# Patient Record
Sex: FEMALE | NOT HISPANIC OR LATINO
[De-identification: names, ages, dates, MRNs, and addresses within clinical notes are randomized per-mention and may not be internally consistent; named-entity substitution may affect disease eponyms.]

---

## 2017-03-02 ENCOUNTER — RX ONLY (OUTPATIENT)
Age: 67
Setting detail: RX ONLY
End: 2017-03-02

## 2017-03-02 RX ORDER — FLUOROURACIL 2 G/40G
CREAM TOPICAL
Qty: 1 | Refills: 0 | Status: ERX | COMMUNITY
Start: 2017-03-02

## 2017-03-20 ENCOUNTER — APPOINTMENT (RX ONLY)
Dept: URBAN - NONMETROPOLITAN AREA CLINIC 25 | Facility: CLINIC | Age: 67
Setting detail: DERMATOLOGY
End: 2017-03-20

## 2017-03-20 VITALS — HEIGHT: 63 IN | WEIGHT: 138 LBS | SYSTOLIC BLOOD PRESSURE: 146 MMHG | DIASTOLIC BLOOD PRESSURE: 96 MMHG

## 2017-03-20 DIAGNOSIS — L57.0 ACTINIC KERATOSIS: ICD-10-CM

## 2017-03-20 DIAGNOSIS — L65.9 NONSCARRING HAIR LOSS, UNSPECIFIED: ICD-10-CM

## 2017-03-20 DIAGNOSIS — L98.8 OTHER SPECIFIED DISORDERS OF THE SKIN AND SUBCUTANEOUS TISSUE: ICD-10-CM

## 2017-03-20 PROBLEM — L90.8 OTHER ATROPHIC DISORDERS OF SKIN: Status: ACTIVE | Noted: 2017-03-20

## 2017-03-20 PROBLEM — L85.3 XEROSIS CUTIS: Status: ACTIVE | Noted: 2017-03-20

## 2017-03-20 PROCEDURE — ? PATIENT SPECIFIC COUNSELING

## 2017-03-20 PROCEDURE — 17000 DESTRUCT PREMALG LESION: CPT

## 2017-03-20 PROCEDURE — 99213 OFFICE O/P EST LOW 20 MIN: CPT | Mod: 25

## 2017-03-20 PROCEDURE — ? LIQUID NITROGEN

## 2017-03-20 PROCEDURE — ? COUNSELING

## 2017-03-20 ASSESSMENT — LOCATION DETAILED DESCRIPTION DERM
LOCATION DETAILED: LEFT SUPERIOR ANTERIOR NECK
LOCATION DETAILED: RIGHT NASAL SIDEWALL
LOCATION DETAILED: LEFT CENTRAL MALAR CHEEK
LOCATION DETAILED: HAIR

## 2017-03-20 ASSESSMENT — LOCATION SIMPLE DESCRIPTION DERM
LOCATION SIMPLE: LEFT ANTERIOR NECK
LOCATION SIMPLE: LEFT CHEEK
LOCATION SIMPLE: HAIR
LOCATION SIMPLE: RIGHT NOSE

## 2017-03-20 ASSESSMENT — LOCATION ZONE DERM
LOCATION ZONE: NECK
LOCATION ZONE: SCALP
LOCATION ZONE: NOSE
LOCATION ZONE: FACE

## 2017-03-20 NOTE — PROCEDURE: MIPS QUALITY
Detail Level: Detailed
Quality 265: Biopsy Follow-Up: Biopsy results reviewed, communicated, tracked, and documented
Quality 431: Preventive Care And Screening: Unhealthy Alcohol Use - Screening: Patient screened for unhealthy alcohol use using a single question and scores less than 2 times per year
Quality 130: Documentation Of Current Medications In The Medical Record: Current Medications Documented
Quality 226: Preventive Care And Screening: Tobacco Use: Screening And Cessation Intervention: Patient screened for tobacco and never smoked
Quality 110: Preventive Care And Screening: Influenza Immunization: Influenza immunization was not ordered or administered, reason not given

## 2017-03-20 NOTE — PROCEDURE: PATIENT SPECIFIC COUNSELING
Recommended kybella and blepharoplasty
Detail Level: Zone
Discussed treatment options including OTC topicals and orals, prescription topicals and orals, and hair transplants. Pt chose treatment documented.\\n\\nBiotin supplements OK. Use as directed, do not overdose.\\n\\nHanded patient card with biotin, use as directed, do not overdose

## 2017-03-20 NOTE — HPI: HAIR LOSS
How Did The Hair Loss Occur?: gradual in onset
Additional History: Patient has not used biotin yet. Asks about options

## 2017-03-20 NOTE — HPI: FACE (AGING FACE)
How Severe Is It?: mild
Is This A New Presentation, Or A Follow-Up?: Aging Face
Additional History: Patient asks about treatment options

## 2017-03-20 NOTE — PROCEDURE: LIQUID NITROGEN
Render Post-Care Instructions In Note?: no
Number Of Freeze-Thaw Cycles: 2 freeze-thaw cycles
Detail Level: Detailed
Duration Of Freeze Thaw-Cycle (Seconds): 1
Consent: The patient's consent was obtained including but not limited to risks of crusting, scabbing, blistering, scarring, darker or lighter pigmentary change, recurrence, incomplete removal and infection.
Post-Care Instructions: I reviewed with the patient in detail post-care instructions. Patient is to wear sunprotection, and avoid picking at any of the treated lesions. Pt may apply Vaseline to crusted or scabbing areas.

## 2017-03-20 NOTE — HPI: SKIN LESION
How Severe Is Your Skin Lesion?: mild
Has Your Skin Lesion Been Treated?: not been treated
Is This A New Presentation, Or A Follow-Up?: Skin Lesion
Additional History: Patient could not  efudex would like treated with LN2

## 2018-05-24 ENCOUNTER — RX ONLY (OUTPATIENT)
Age: 68
Setting detail: RX ONLY
End: 2018-05-24

## 2018-05-24 RX ORDER — TOBRAMYCIN AND DEXAMETHASONE 3; 1 MG/ML; MG/ML
SUSPENSION/ DROPS OPHTHALMIC
Qty: 1 | Refills: 0 | Status: ERX | COMMUNITY
Start: 2018-05-24

## 2018-05-30 ENCOUNTER — RX ONLY (OUTPATIENT)
Age: 68
Setting detail: RX ONLY
End: 2018-05-30

## 2018-05-30 RX ORDER — MUPIROCIN 20 MG/G
OINTMENT TOPICAL
Qty: 1 | Refills: 0 | Status: CANCELLED

## 2018-08-16 ENCOUNTER — APPOINTMENT (RX ONLY)
Dept: URBAN - NONMETROPOLITAN AREA CLINIC 31 | Facility: CLINIC | Age: 68
Setting detail: DERMATOLOGY
End: 2018-08-16

## 2018-08-16 DIAGNOSIS — Z48.817 ENCOUNTER FOR SURGICAL AFTERCARE FOLLOWING SURGERY ON THE SKIN AND SUBCUTANEOUS TISSUE: ICD-10-CM

## 2018-08-16 PROCEDURE — ? BLEPHAROPLASTY

## 2018-08-16 PROCEDURE — ? PATIENT SPECIFIC COUNSELING

## 2018-08-16 ASSESSMENT — LOCATION ZONE DERM: LOCATION ZONE: EYELID

## 2018-08-16 ASSESSMENT — LOCATION DETAILED DESCRIPTION DERM
LOCATION DETAILED: LEFT LATERAL SUPERIOR EYELID
LOCATION DETAILED: RIGHT MEDIAL SUPERIOR EYELID

## 2018-08-16 ASSESSMENT — LOCATION SIMPLE DESCRIPTION DERM
LOCATION SIMPLE: LEFT SUPERIOR EYELID
LOCATION SIMPLE: RIGHT SUPERIOR EYELID

## 2018-08-16 NOTE — PROCEDURE: BLEPHAROPLASTY
Intro: The patient was prepped with alcohol and 35% TCA and Aquaphor was placed on the lash line.
Upper Eyelid Blepharoplasty: A sterile marker was used to gretta the skin snips along the elliptical demarcation. A gradle was used to excise the skin previously marked. All active bleeding points were controlled as necessary.
Orbicularis Muscle Closure Sutures: 7-0 vicryl
Lower Eyelid Blepharoplasty Ii: An overlappping technique was then utilized to dermine the amount of redundant skin and muscle to be excised from lower eyelids. This determination revealed a 3mm amount of vertical skin to be excised which was performed across the length of the skin flap.
Post-Care Instructions: An ice compress was applied over the eyes. At the conclusion of the procedure, the patient left the operating room in good condition.   The patient was advised to call immediately for any pain, lid swelling or tenderness, discharge, or loss of vision. The patient will return in several days for a postoperative exam.
Skin Closure Sutures: 6-0 fast asborbing gut
Wound Care Applied To Incision Site: Aquaphor
Conjunctiva Closure: none-secondary intention
Detail Level: Detailed
Other Anesthesia (If Not Blank Will Override The Other Anesthesia Field: Lidocaine 1% with Epinephrine
Temporary Frost: The previously placed traction sutures was now secured in place to the forehead with Steri-Strips and Mastisol solution as a temporary Frost suture.
Estimated Blood Loss (Cc): minimal
Lower Eyelid Blepharoplasty I: A 4-0 silk traction suture was placed through the lower lid margins and traction was applied superiorly. A 15 blade was used to make an infraciliary incision across the length of the eyelid extending 1 cm beyond the latereal commissure. A skin muscle flap was dissected using the Bovie in the cutting mode down to the level of the interior orbital rim. The nasal, central, and temporal fat compartments were exposed and these were also trimmed back to the level of the inferior orbital rim with a conservative fat resection removal to debulk the herniation of the fat compartment. Minimal resection of the nasal fat pad performed with the predominance inolving the temporal fat pad. Meticulous hemostasis was maintained.
Skin Closure: simple interrupted
Canthotomy: A lateral canthotomy and cantholyis of the inferior marti was performed. A lateral canthal tendon was created in the usual standard manner and secured to the lateral orbital rim periosteum with 2 interrupted 5-0 Prolene sutures. The lateral commissure was reestablished joining the upper and lower eyelids at the gray line with interrupted 7-0 vicryl suture with the knot buried in the soft tissues.
Consent: The risks, benefits and alternatives of blepharoplasty were discussed with the patient. The patient read and signed the consent form, was identified, and was seated in the exam chair. In the preoperative area with the patient positioned upright on the stretcher, a marker pen was used to delineate the natural lid creases in the affected lids, and the amount of redundant skin to be removed utilizing a pinch technique.

## 2018-08-16 NOTE — HPI: OTHER
Condition:: Patient is here for follow up from bleph. More skin to be removed today via snip removal.
Please Describe Your Condition:: Follow up.